# Patient Record
Sex: MALE | Race: WHITE | NOT HISPANIC OR LATINO | URBAN - METROPOLITAN AREA
[De-identification: names, ages, dates, MRNs, and addresses within clinical notes are randomized per-mention and may not be internally consistent; named-entity substitution may affect disease eponyms.]

---

## 2020-10-15 ENCOUNTER — OFFICE VISIT (OUTPATIENT)
Dept: URBAN - METROPOLITAN AREA CLINIC 121 | Facility: CLINIC | Age: 45
End: 2020-10-15

## 2021-09-27 ENCOUNTER — OFFICE VISIT (OUTPATIENT)
Dept: URBAN - METROPOLITAN AREA CLINIC 63 | Facility: CLINIC | Age: 46
End: 2021-09-27

## 2021-10-01 ENCOUNTER — OFFICE VISIT (OUTPATIENT)
Dept: URBAN - METROPOLITAN AREA CLINIC 121 | Facility: CLINIC | Age: 46
End: 2021-10-01

## 2021-11-15 ENCOUNTER — OFFICE VISIT (OUTPATIENT)
Dept: URBAN - METROPOLITAN AREA MEDICAL CENTER 14 | Facility: MEDICAL CENTER | Age: 46
End: 2021-11-15

## 2021-11-29 ENCOUNTER — OFFICE VISIT (OUTPATIENT)
Dept: URBAN - METROPOLITAN AREA CLINIC 63 | Facility: CLINIC | Age: 46
End: 2021-11-29

## 2022-02-28 ENCOUNTER — OFFICE VISIT (OUTPATIENT)
Dept: URBAN - METROPOLITAN AREA CLINIC 63 | Facility: CLINIC | Age: 47
End: 2022-02-28

## 2022-06-22 ENCOUNTER — OFFICE VISIT (OUTPATIENT)
Dept: URBAN - METROPOLITAN AREA CLINIC 63 | Facility: CLINIC | Age: 47
End: 2022-06-22

## 2022-07-09 ENCOUNTER — TELEPHONE ENCOUNTER (OUTPATIENT)
Dept: URBAN - METROPOLITAN AREA CLINIC 121 | Facility: CLINIC | Age: 47
End: 2022-07-09

## 2022-07-09 RX ORDER — WARFARIN 10 MG/1
TABLET ORAL ONCE A DAY
Refills: 0 | OUTPATIENT
Start: 2021-09-27 | End: 2021-11-29

## 2022-07-09 RX ORDER — METOPROLOL SUCCINATE 50 MG/1
TABLET, EXTENDED RELEASE ORAL ONCE A DAY
Refills: 0 | OUTPATIENT
Start: 2021-09-27 | End: 2021-11-29

## 2022-07-09 RX ORDER — WARFARIN 10 MG/1
TABLET ORAL ONCE A DAY
Refills: 0 | OUTPATIENT
Start: 2021-11-29 | End: 2021-11-29

## 2022-07-10 ENCOUNTER — TELEPHONE ENCOUNTER (OUTPATIENT)
Dept: URBAN - METROPOLITAN AREA CLINIC 121 | Facility: CLINIC | Age: 47
End: 2022-07-10

## 2022-07-10 RX ORDER — AZATHIOPRINE 50 MG/1
4 TABLETS DAILY TABLET ORAL
Refills: 3 | Status: ACTIVE | COMMUNITY
Start: 2021-12-15

## 2022-07-10 RX ORDER — METOPROLOL SUCCINATE 25 MG/1
0.5 TABS PO ONE TIME DAILY TABLET, EXTENDED RELEASE ORAL TAKE AS DIRECTED
Refills: 0 | Status: ACTIVE | COMMUNITY
Start: 2021-11-29

## 2022-07-10 RX ORDER — ADALIMUMAB 40MG/0.4ML
EVERY OTHER WEEK KIT SUBCUTANEOUS
Refills: 0 | Status: ACTIVE | COMMUNITY
Start: 2022-06-22

## 2022-07-10 RX ORDER — VIT A/VIT C/VIT E/ZINC/COPPER 4296-226
CAPSULE ORAL ONCE A DAY
Refills: 0 | Status: ACTIVE | COMMUNITY
Start: 2021-11-29

## 2022-07-10 RX ORDER — ASPIRIN 81 MG/1
TABLET, FILM COATED ORAL ONCE A DAY
Refills: 0 | Status: ACTIVE | COMMUNITY
Start: 2021-11-29

## 2022-07-10 RX ORDER — WARFARIN SODIUM 5 MG/1
TABLET ORAL
Refills: 0 | Status: ACTIVE | COMMUNITY
Start: 2021-11-29

## 2022-07-18 ENCOUNTER — TELEPHONE ENCOUNTER (OUTPATIENT)
Dept: URBAN - METROPOLITAN AREA CLINIC 63 | Facility: CLINIC | Age: 47
End: 2022-07-18

## 2022-07-19 ENCOUNTER — TELEPHONE ENCOUNTER (OUTPATIENT)
Dept: URBAN - METROPOLITAN AREA CLINIC 63 | Facility: CLINIC | Age: 47
End: 2022-07-19

## 2022-07-22 ENCOUNTER — TELEPHONE ENCOUNTER (OUTPATIENT)
Dept: URBAN - METROPOLITAN AREA CLINIC 63 | Facility: CLINIC | Age: 47
End: 2022-07-22

## 2022-08-03 ENCOUNTER — TELEPHONE ENCOUNTER (OUTPATIENT)
Dept: URBAN - METROPOLITAN AREA CLINIC 63 | Facility: CLINIC | Age: 47
End: 2022-08-03

## 2022-08-13 ENCOUNTER — WEB ENCOUNTER (OUTPATIENT)
Dept: URBAN - METROPOLITAN AREA CLINIC 63 | Facility: CLINIC | Age: 47
End: 2022-08-13

## 2022-09-12 ENCOUNTER — OFFICE VISIT (OUTPATIENT)
Dept: URBAN - METROPOLITAN AREA CLINIC 63 | Facility: CLINIC | Age: 47
End: 2022-09-12
Payer: SELF-PAY

## 2022-09-12 ENCOUNTER — WEB ENCOUNTER (OUTPATIENT)
Dept: URBAN - METROPOLITAN AREA CLINIC 63 | Facility: CLINIC | Age: 47
End: 2022-09-12

## 2022-09-12 ENCOUNTER — LAB OUTSIDE AN ENCOUNTER (OUTPATIENT)
Dept: URBAN - METROPOLITAN AREA CLINIC 63 | Facility: CLINIC | Age: 47
End: 2022-09-12

## 2022-09-12 VITALS
TEMPERATURE: 97.3 F | WEIGHT: 186.2 LBS | OXYGEN SATURATION: 99 % | HEIGHT: 75 IN | HEART RATE: 77 BPM | BODY MASS INDEX: 23.15 KG/M2 | SYSTOLIC BLOOD PRESSURE: 120 MMHG | DIASTOLIC BLOOD PRESSURE: 77 MMHG

## 2022-09-12 DIAGNOSIS — K50.80 CROHN'S DISEASE OF BOTH SMALL AND LARGE INTESTINE WITHOUT COMPLICATION: ICD-10-CM

## 2022-09-12 PROCEDURE — 99213 OFFICE O/P EST LOW 20 MIN: CPT | Performed by: INTERNAL MEDICINE

## 2022-09-12 RX ORDER — VIT A/VIT C/VIT E/ZINC/COPPER 4296-226
CAPSULE ORAL ONCE A DAY
Refills: 0 | Status: ACTIVE | COMMUNITY
Start: 2021-11-29

## 2022-09-12 RX ORDER — METOPROLOL SUCCINATE 50 MG/1
1 TABLET TABLET, FILM COATED, EXTENDED RELEASE ORAL ONCE A DAY
Refills: 0 | Status: ACTIVE | COMMUNITY
Start: 2021-11-29

## 2022-09-12 RX ORDER — ASPIRIN 81 MG/1
TABLET, FILM COATED ORAL ONCE A DAY
Refills: 0 | Status: ACTIVE | COMMUNITY
Start: 2021-11-29

## 2022-09-12 RX ORDER — ADALIMUMAB 40MG/0.4ML
EVERY OTHER WEEK KIT SUBCUTANEOUS
Refills: 0 | Status: ACTIVE | COMMUNITY
Start: 2022-06-22

## 2022-09-12 RX ORDER — WARFARIN SODIUM 5 MG/1
TABLET ORAL
Refills: 0 | Status: ACTIVE | COMMUNITY
Start: 2021-11-29

## 2022-09-12 NOTE — HPI-TODAY'S VISIT:
This is a 46-year-old male patient coming in for follow-up of Crohn's disease.  Patient has had some loose stools with bright red bleeding per rectum which prompted colonoscopy.  Colonoscopy showed mild inflammation in the TI characterized by aphthous ulcers and IC valve stenosis.  Chronic inflammatory changes were seen in the colon as noted.  Active and chronic inflammation was seen in the left colon.  Biopsies came back positive for hyperemia in the terminal ileum and right colon.  Transverse colon and left colon showed mild active and chronic inflammation.  Crypt abscess was seen in the left colon concerning for Crohn's disease as well.  CT enterography showed mild degree of distal ileal stricturing with active phase of moderate severity.  Patient has been on Humira 40 every other week since then.  He no longer has any blood in the stools.  Had some elevated stool calprotectin at 170 in the past.  Recent blood work shows normal ESR, CRP, stool calprotectin at 89.  Completely asymptomatic at this time.  Humira levels were seen at 11.5 with no antibody levels.  Last colonoscopy was in November 2021.

## 2022-09-17 ENCOUNTER — WEB ENCOUNTER (OUTPATIENT)
Dept: URBAN - METROPOLITAN AREA CLINIC 63 | Facility: CLINIC | Age: 47
End: 2022-09-17

## 2022-10-18 ENCOUNTER — TELEPHONE ENCOUNTER (OUTPATIENT)
Dept: URBAN - METROPOLITAN AREA CLINIC 63 | Facility: CLINIC | Age: 47
End: 2022-10-18

## 2022-10-25 ENCOUNTER — OFFICE VISIT (OUTPATIENT)
Dept: URBAN - METROPOLITAN AREA SURGERY CENTER 4 | Facility: SURGERY CENTER | Age: 47
End: 2022-10-25
Payer: SELF-PAY

## 2022-10-25 ENCOUNTER — CLAIMS CREATED FROM THE CLAIM WINDOW (OUTPATIENT)
Dept: URBAN - METROPOLITAN AREA CLINIC 4 | Facility: CLINIC | Age: 47
End: 2022-10-25
Payer: SELF-PAY

## 2022-10-25 DIAGNOSIS — K52.9 NONINFECTIOUS GASTROENTERITIS AND COLITIS, UNSPECIFIED: ICD-10-CM

## 2022-10-25 DIAGNOSIS — K63.89 OTHER SPECIFIED DISEASES OF INTESTINE: ICD-10-CM

## 2022-10-25 DIAGNOSIS — K52.3 INDETERMINATE COLITIS: ICD-10-CM

## 2022-10-25 DIAGNOSIS — K50.812 CROHN'S DISEASE OF BOTH SMALL AND LARGE INTESTINE WITH INTESTINAL OBSTRUCTION: ICD-10-CM

## 2022-10-25 DIAGNOSIS — K64.1 GRADE II INTERNAL HEMORRHOIDS: ICD-10-CM

## 2022-10-25 PROCEDURE — 45380 COLONOSCOPY AND BIOPSY: CPT | Performed by: INTERNAL MEDICINE

## 2022-10-25 PROCEDURE — 88305 TISSUE EXAM BY PATHOLOGIST: CPT | Performed by: PATHOLOGY

## 2022-10-25 RX ORDER — ADALIMUMAB 40MG/0.4ML
EVERY OTHER WEEK KIT SUBCUTANEOUS
Refills: 0 | Status: ACTIVE | COMMUNITY
Start: 2022-06-22

## 2022-10-25 RX ORDER — VIT A/VIT C/VIT E/ZINC/COPPER 4296-226
CAPSULE ORAL ONCE A DAY
Refills: 0 | Status: ACTIVE | COMMUNITY
Start: 2021-11-29

## 2022-10-25 RX ORDER — ASPIRIN 81 MG/1
TABLET, FILM COATED ORAL ONCE A DAY
Refills: 0 | Status: ACTIVE | COMMUNITY
Start: 2021-11-29

## 2022-10-25 RX ORDER — METOPROLOL SUCCINATE 50 MG/1
1 TABLET TABLET, FILM COATED, EXTENDED RELEASE ORAL ONCE A DAY
Refills: 0 | Status: ACTIVE | COMMUNITY
Start: 2021-11-29

## 2022-10-25 RX ORDER — WARFARIN SODIUM 5 MG/1
TABLET ORAL
Refills: 0 | Status: ACTIVE | COMMUNITY
Start: 2021-11-29

## 2022-10-31 PROBLEM — 1085831000119104 INTESTINAL OBSTRUCTION DUE TO CROHN'S DISEASE OF SMALL AND LARGE INTESTINE: Status: ACTIVE | Noted: 2022-10-31

## 2022-11-14 ENCOUNTER — OFFICE VISIT (OUTPATIENT)
Dept: URBAN - METROPOLITAN AREA CLINIC 63 | Facility: CLINIC | Age: 47
End: 2022-11-14

## 2022-11-18 ENCOUNTER — OFFICE VISIT (OUTPATIENT)
Dept: URBAN - METROPOLITAN AREA CLINIC 63 | Facility: CLINIC | Age: 47
End: 2022-11-18
Payer: SELF-PAY

## 2022-11-18 VITALS
TEMPERATURE: 97.7 F | SYSTOLIC BLOOD PRESSURE: 120 MMHG | DIASTOLIC BLOOD PRESSURE: 71 MMHG | WEIGHT: 184 LBS | HEIGHT: 75 IN | OXYGEN SATURATION: 100 % | HEART RATE: 71 BPM | BODY MASS INDEX: 22.88 KG/M2

## 2022-11-18 DIAGNOSIS — K50.80 CROHN'S DISEASE OF BOTH SMALL AND LARGE INTESTINE WITHOUT COMPLICATION: ICD-10-CM

## 2022-11-18 PROBLEM — 71833008: Status: ACTIVE | Noted: 2022-09-12

## 2022-11-18 PROCEDURE — 99214 OFFICE O/P EST MOD 30 MIN: CPT | Performed by: INTERNAL MEDICINE

## 2022-11-18 RX ORDER — ADALIMUMAB 40MG/0.4ML
EVERY OTHER WEEK KIT SUBCUTANEOUS
Refills: 0 | Status: ACTIVE | COMMUNITY
Start: 2022-06-22

## 2022-11-18 RX ORDER — ASPIRIN 81 MG/1
TABLET, FILM COATED ORAL ONCE A DAY
Refills: 0 | Status: ACTIVE | COMMUNITY
Start: 2021-11-29

## 2022-11-18 RX ORDER — VIT A/VIT C/VIT E/ZINC/COPPER 4296-226
CAPSULE ORAL ONCE A DAY
Refills: 0 | Status: ACTIVE | COMMUNITY
Start: 2021-11-29

## 2022-11-18 RX ORDER — METOPROLOL SUCCINATE 50 MG/1
1 TABLET TABLET, FILM COATED, EXTENDED RELEASE ORAL ONCE A DAY
Refills: 0 | Status: ACTIVE | COMMUNITY
Start: 2021-11-29

## 2022-11-18 RX ORDER — WARFARIN SODIUM 5 MG/1
TABLET ORAL
Refills: 0 | Status: ACTIVE | COMMUNITY
Start: 2021-11-29

## 2022-11-18 NOTE — HPI-TODAY'S VISIT:
This is a 46-year-old male patient coming in for follow-up of Crohn's disease.  Patient has had some loose stools with bright red bleeding per rectum which prompted colonoscopy.  Colonoscopy in 2021 showed mild inflammation in the TI characterized by aphthous ulcers and IC valve stenosis.  Chronic inflammatory changes were seen in the colon as noted.  Active and chronic inflammation was seen in the left colon.  Biopsies came back positive for hyperemia in the terminal ileum and right colon.  Transverse colon and left colon showed mild active and chronic inflammation.  Crypt abscess was seen in the left colon concerning for Crohn's disease as well.  CT enterography showed mild degree of distal ileal stricturing with active phase of moderate severity.  Patient has been on Humira 40 every other week since then.  He no longer has any blood in the stools.  Had some elevated stool calprotectin at 170 in the past.  Recent blood work shows normal ESR, CRP, stool calprotectin at 89.  Completely asymptomatic at this time.  Humira levels were seen at 11.5 with no antibody levels.  Recent colonoscopy with mid active inflammtion in the TI, with stricture, colitis in the entire colon.

## 2022-12-09 ENCOUNTER — TELEPHONE ENCOUNTER (OUTPATIENT)
Dept: URBAN - METROPOLITAN AREA CLINIC 63 | Facility: CLINIC | Age: 47
End: 2022-12-09

## 2022-12-14 PROBLEM — 34000006: Status: ACTIVE | Noted: 2022-12-14

## 2023-01-08 ENCOUNTER — WEB ENCOUNTER (OUTPATIENT)
Dept: URBAN - METROPOLITAN AREA CLINIC 63 | Facility: CLINIC | Age: 48
End: 2023-01-08

## 2023-02-10 ENCOUNTER — TELEPHONE ENCOUNTER (OUTPATIENT)
Dept: URBAN - METROPOLITAN AREA CLINIC 63 | Facility: CLINIC | Age: 48
End: 2023-02-10

## 2023-04-10 ENCOUNTER — OFFICE VISIT (OUTPATIENT)
Dept: URBAN - METROPOLITAN AREA CLINIC 63 | Facility: CLINIC | Age: 48
End: 2023-04-10
Payer: SELF-PAY

## 2023-04-10 VITALS
DIASTOLIC BLOOD PRESSURE: 82 MMHG | WEIGHT: 178.8 LBS | BODY MASS INDEX: 22.23 KG/M2 | OXYGEN SATURATION: 98 % | HEIGHT: 75 IN | TEMPERATURE: 98 F | SYSTOLIC BLOOD PRESSURE: 132 MMHG | HEART RATE: 71 BPM

## 2023-04-10 DIAGNOSIS — K50.90 CROHN'S DISEASE WITHOUT COMPLICATION, UNSPECIFIED GASTROINTESTINAL TRACT LOCATION: ICD-10-CM

## 2023-04-10 PROCEDURE — 99214 OFFICE O/P EST MOD 30 MIN: CPT | Performed by: INTERNAL MEDICINE

## 2023-04-10 RX ORDER — ASPIRIN 81 MG/1
TABLET, FILM COATED ORAL ONCE A DAY
Refills: 0 | Status: ACTIVE | COMMUNITY
Start: 2021-11-29

## 2023-04-10 RX ORDER — METOPROLOL SUCCINATE 50 MG/1
1 TABLET TABLET, FILM COATED, EXTENDED RELEASE ORAL ONCE A DAY
Refills: 0 | Status: ACTIVE | COMMUNITY
Start: 2021-11-29

## 2023-04-10 RX ORDER — VIT A/VIT C/VIT E/ZINC/COPPER 4296-226
CAPSULE ORAL ONCE A DAY
Refills: 0 | Status: ACTIVE | COMMUNITY
Start: 2021-11-29

## 2023-04-10 RX ORDER — WARFARIN SODIUM 5 MG/1
TABLET ORAL
Refills: 0 | Status: ACTIVE | COMMUNITY
Start: 2021-11-29

## 2023-04-10 NOTE — HPI-TODAY'S VISIT:
This is a 46-year-old male patient coming in for follow-up of Crohn's disease.  Patient has had some loose stools with bright red bleeding per rectum which prompted colonoscopy.  Colonoscopy in 2021 showed mild inflammation in the TI characterized by aphthous ulcers and IC valve stenosis.  Chronic inflammatory changes were seen in the colon as noted.  Active and chronic inflammation was seen in the left colon.  Biopsies came back positive for hyperemia in the terminal ileum and right colon.  Transverse colon and left colon showed mild active and chronic inflammation.  Crypt abscess was seen in the left colon concerning for Crohn's disease as well.  CT enterography showed mild degree of distal ileal stricturing with active phase of moderate severity.  Patient has been on Humira 40 every other week since then.  He no longer has any blood in the stools.  Had some elevated stool calprotectin at 170 in the past.  Recent blood work shows normal ESR, CRP, stool calprotectin at 89.  Completely asymptomatic at this time.  Humira levels were seen at 11.5 with no antibody levels.  Recent colonoscopy with mid active inflammtion in the TI, with stricture, colitis in the entire colon. Switched to skyrizi. Patient feels worse symptomatically. 6-8 bowel movements/day. occ blood i the stool. ABd pain. s/p loading infusions. will start maintenance next week.

## 2023-04-15 LAB
A/G RATIO: 1.7
ALBUMIN: 4.1
ALKALINE PHOSPHATASE: 93
ALT (SGPT): 13
AMBIG ABBREV CMP14 DEFAULT: (no result)
AST (SGOT): 20
BASO (ABSOLUTE): 0
BASOS: 1
BILIRUBIN, TOTAL: 0.8
BUN/CREATININE RATIO: 9
BUN: 9
CALCIUM: 9.3
CALPROTECTIN, FECAL: 434
CARBON DIOXIDE, TOTAL: 28
CHLORIDE: 103
CREATININE: 0.98
EGFR: 96
EOS (ABSOLUTE): 0.2
EOS: 4
GLOBULIN, TOTAL: 2.4
GLUCOSE: 88
HEMATOCRIT: 42.2
HEMATOLOGY COMMENTS:: (no result)
HEMOGLOBIN: 14.2
IMMATURE CELLS: (no result)
IMMATURE GRANS (ABS): 0
IMMATURE GRANULOCYTES: 0
LYMPHS (ABSOLUTE): 1.2
LYMPHS: 26
MCH: 29.2
MCHC: 33.6
MCV: 87
MONOCYTES(ABSOLUTE): 0.5
MONOCYTES: 12
NEUTROPHILS (ABSOLUTE): 2.5
NEUTROPHILS: 57
NRBC: (no result)
PLATELETS: 261
POTASSIUM: 5.3
PROTEIN, TOTAL: 6.5
RBC: 4.86
RDW: 12.9
SODIUM: 140
WBC: 4.5

## 2023-04-18 ENCOUNTER — WEB ENCOUNTER (OUTPATIENT)
Dept: URBAN - METROPOLITAN AREA CLINIC 63 | Facility: CLINIC | Age: 48
End: 2023-04-18

## 2023-04-24 ENCOUNTER — OFFICE VISIT (OUTPATIENT)
Dept: URBAN - METROPOLITAN AREA TELEHEALTH 1 | Facility: TELEHEALTH | Age: 48
End: 2023-04-24

## 2023-04-24 ENCOUNTER — TELEPHONE ENCOUNTER (OUTPATIENT)
Dept: URBAN - METROPOLITAN AREA CLINIC 64 | Facility: CLINIC | Age: 48
End: 2023-04-24

## 2023-04-24 RX ORDER — WARFARIN SODIUM 5 MG/1
TABLET ORAL
Refills: 0 | COMMUNITY
Start: 2021-11-29

## 2023-04-24 RX ORDER — VIT A/VIT C/VIT E/ZINC/COPPER 4296-226
CAPSULE ORAL ONCE A DAY
Refills: 0 | COMMUNITY
Start: 2021-11-29

## 2023-04-24 RX ORDER — METOPROLOL SUCCINATE 50 MG/1
1 TABLET TABLET, FILM COATED, EXTENDED RELEASE ORAL ONCE A DAY
Refills: 0 | COMMUNITY
Start: 2021-11-29

## 2023-04-24 RX ORDER — ASPIRIN 81 MG/1
TABLET, FILM COATED ORAL ONCE A DAY
Refills: 0 | COMMUNITY
Start: 2021-11-29

## 2023-04-25 ENCOUNTER — OFFICE VISIT (OUTPATIENT)
Dept: URBAN - METROPOLITAN AREA CLINIC 63 | Facility: CLINIC | Age: 48
End: 2023-04-25
Payer: SELF-PAY

## 2023-04-25 VITALS
TEMPERATURE: 97.8 F | WEIGHT: 182.8 LBS | HEART RATE: 75 BPM | SYSTOLIC BLOOD PRESSURE: 128 MMHG | HEIGHT: 75 IN | OXYGEN SATURATION: 98 % | DIASTOLIC BLOOD PRESSURE: 84 MMHG | BODY MASS INDEX: 22.73 KG/M2

## 2023-04-25 DIAGNOSIS — K50.80 CROHN'S DISEASE OF BOTH SMALL AND LARGE INTESTINE WITHOUT COMPLICATION: ICD-10-CM

## 2023-04-25 DIAGNOSIS — Z95.2 HISTORY OF MECHANICAL AORTIC VALVE REPLACEMENT: ICD-10-CM

## 2023-04-25 DIAGNOSIS — Z79.01 LONG TERM CURRENT USE OF ANTICOAGULANT: ICD-10-CM

## 2023-04-25 PROBLEM — 711150003: Status: ACTIVE | Noted: 2023-04-25

## 2023-04-25 PROBLEM — 125411000119107: Status: ACTIVE | Noted: 2023-04-25

## 2023-04-25 PROCEDURE — 99214 OFFICE O/P EST MOD 30 MIN: CPT | Performed by: NURSE PRACTITIONER

## 2023-04-25 RX ORDER — WARFARIN SODIUM 5 MG/1
TABLET ORAL
Refills: 0 | Status: ACTIVE | COMMUNITY
Start: 2021-11-29

## 2023-04-25 RX ORDER — VIT A/VIT C/VIT E/ZINC/COPPER 4296-226
CAPSULE ORAL ONCE A DAY
Refills: 0 | Status: ACTIVE | COMMUNITY
Start: 2021-11-29

## 2023-04-25 RX ORDER — ASPIRIN 81 MG/1
TABLET, FILM COATED ORAL ONCE A DAY
Refills: 0 | Status: ACTIVE | COMMUNITY
Start: 2021-11-29

## 2023-04-25 RX ORDER — METOPROLOL SUCCINATE 50 MG/1
1 TABLET TABLET, FILM COATED, EXTENDED RELEASE ORAL ONCE A DAY
Refills: 0 | Status: ACTIVE | COMMUNITY
Start: 2021-11-29

## 2023-04-25 NOTE — PHYSICAL EXAM CARDIOVASCULAR:
no edema, grade 5/6 systolic murmur at left sternal border with thrill, ,  regular rate and rhythm , no edema.

## 2023-04-25 NOTE — PHYSICAL EXAM GASTROINTESTINAL
Abdomen , soft, nontender, nondistended , no guarding or rigidity , no masses palpable , normal bowel sounds , Liver and Spleen , no hepatomegaly present , liver nontender , spleen not palpable, No Ascites, No hernia

## 2023-04-25 NOTE — HPI-CROHN'S DISEASE
Diagnosed 2021  Last Colonoscopy: 10/2022 colonoscopy appears improved compared to 2021 exam but there is still mild ileitis and narrowing present  Current IBD Meds: skyrizi, was due for first OBI last week  Prior IBD Meds: Humira 40mg Biweekly  Steroid use/response: none  Most recent imagin2021 distal ileal stricturing  Extraintestinal manifestations: none  Inflammatory Markers: 2023 calprotectin 453 up from 89 on humira  Required  biologic testin22 quant gold and hep b negative  Risk factors for severe disease: male sex, small bowel disease, stricturing phenotype  IBD surgical history: none  Personal history of cancer: none  Cardiac history: mechanical aortic valve replacement in , post operative afib. Valve replaced due to bicuspid aortic valve

## 2023-04-26 ENCOUNTER — WEB ENCOUNTER (OUTPATIENT)
Dept: URBAN - METROPOLITAN AREA CLINIC 63 | Facility: CLINIC | Age: 48
End: 2023-04-26

## 2023-04-26 ENCOUNTER — TELEPHONE ENCOUNTER (OUTPATIENT)
Dept: URBAN - METROPOLITAN AREA CLINIC 63 | Facility: CLINIC | Age: 48
End: 2023-04-26

## 2023-04-28 ENCOUNTER — WEB ENCOUNTER (OUTPATIENT)
Dept: URBAN - METROPOLITAN AREA CLINIC 63 | Facility: CLINIC | Age: 48
End: 2023-04-28

## 2023-05-16 ENCOUNTER — TELEPHONE ENCOUNTER (OUTPATIENT)
Dept: URBAN - METROPOLITAN AREA CLINIC 63 | Facility: CLINIC | Age: 48
End: 2023-05-16

## 2023-06-06 ENCOUNTER — OFFICE VISIT (OUTPATIENT)
Dept: URBAN - METROPOLITAN AREA CLINIC 63 | Facility: CLINIC | Age: 48
End: 2023-06-06
Payer: SELF-PAY

## 2023-06-06 VITALS
OXYGEN SATURATION: 99 % | HEART RATE: 55 BPM | HEIGHT: 75 IN | WEIGHT: 183.8 LBS | TEMPERATURE: 97.4 F | BODY MASS INDEX: 22.85 KG/M2 | DIASTOLIC BLOOD PRESSURE: 70 MMHG | SYSTOLIC BLOOD PRESSURE: 120 MMHG

## 2023-06-06 DIAGNOSIS — K50.80 CROHN'S DISEASE OF BOTH SMALL AND LARGE INTESTINE WITHOUT COMPLICATION: ICD-10-CM

## 2023-06-06 DIAGNOSIS — Z79.01 LONG TERM CURRENT USE OF ANTICOAGULANT: ICD-10-CM

## 2023-06-06 DIAGNOSIS — H04.123 DRY EYES: ICD-10-CM

## 2023-06-06 DIAGNOSIS — G93.32 CHRONIC FATIGUE SYNDROME: ICD-10-CM

## 2023-06-06 PROBLEM — 162290004: Status: ACTIVE | Noted: 2023-06-06

## 2023-06-06 PROCEDURE — 99214 OFFICE O/P EST MOD 30 MIN: CPT | Performed by: NURSE PRACTITIONER

## 2023-06-06 RX ORDER — VIT A/VIT C/VIT E/ZINC/COPPER 4296-226
CAPSULE ORAL ONCE A DAY
Refills: 0 | Status: ACTIVE | COMMUNITY
Start: 2021-11-29

## 2023-06-06 RX ORDER — WARFARIN SODIUM 5 MG/1
TABLET ORAL
Refills: 0 | Status: ACTIVE | COMMUNITY
Start: 2021-11-29

## 2023-06-06 RX ORDER — ASPIRIN 81 MG/1
TABLET, FILM COATED ORAL ONCE A DAY
Refills: 0 | Status: ACTIVE | COMMUNITY
Start: 2021-11-29

## 2023-06-06 RX ORDER — METOPROLOL SUCCINATE 50 MG/1
1 TABLET TABLET, FILM COATED, EXTENDED RELEASE ORAL ONCE A DAY
Refills: 0 | Status: ACTIVE | COMMUNITY
Start: 2021-11-29

## 2023-06-06 NOTE — HPI-TODAY'S VISIT:
Here for follow up of his crohn's disease. Stopped skyrizi and restarted humira with induction roughly 5 weeks ago. Moved immediately to weekly dosing Abdominal pain has resolved, no bleeding in a month and now having 2-3 bm per day rather than 5-10. Fatigue has improved but not resolved.  When on Humira biweekly last year he was asymptomatic from a gi standpoint with a drug level of 11.5 but had mild inflammation at his TI on follow up colonoscopy.  Does not have health insurance currently.

## 2023-06-06 NOTE — HPI-CROHN'S DISEASE
Diagnosed 2021  Last Colonoscopy: 10/2022 colonoscopy appears improved compared to 2021 exam but there is still mild ileitis and narrowing present  Current IBD Meds: Humira 40mg weekly  Prior IBD Meds: Humira 40mg Biweekly, skyrizi 3 inductions doses  Steroid use/response: none  Most recent imagin2021 distal ileal stricturing  Extraintestinal manifestations: none  Inflammatory Markers: 2023 calprotectin 453 up from 89 on humira, CRP and sed rate always normal  Required  biologic testin22 quant gold and hep b negative  Risk factors for severe disease: male sex, small bowel disease, stricturing phenotype  IBD surgical history: none  Personal history of cancer: none   Cardiac history: mechanical aortic valve replacement in , post operative afib. Valve replaced due to bicuspid aortic valve

## 2023-06-21 ENCOUNTER — TELEPHONE ENCOUNTER (OUTPATIENT)
Dept: URBAN - METROPOLITAN AREA CLINIC 63 | Facility: CLINIC | Age: 48
End: 2023-06-21

## 2023-09-12 ENCOUNTER — WEB ENCOUNTER (OUTPATIENT)
Dept: URBAN - METROPOLITAN AREA CLINIC 63 | Facility: CLINIC | Age: 48
End: 2023-09-12

## 2023-09-12 ENCOUNTER — OFFICE VISIT (OUTPATIENT)
Dept: URBAN - METROPOLITAN AREA CLINIC 63 | Facility: CLINIC | Age: 48
End: 2023-09-12
Payer: SELF-PAY

## 2023-09-12 VITALS
WEIGHT: 189.2 LBS | SYSTOLIC BLOOD PRESSURE: 130 MMHG | DIASTOLIC BLOOD PRESSURE: 80 MMHG | TEMPERATURE: 97.7 F | BODY MASS INDEX: 23.52 KG/M2 | HEART RATE: 61 BPM | OXYGEN SATURATION: 96 % | HEIGHT: 75 IN

## 2023-09-12 DIAGNOSIS — Z79.01 LONG TERM CURRENT USE OF ANTICOAGULANT: ICD-10-CM

## 2023-09-12 DIAGNOSIS — K50.80 CROHN'S DISEASE OF BOTH SMALL AND LARGE INTESTINE WITHOUT COMPLICATION: ICD-10-CM

## 2023-09-12 DIAGNOSIS — G93.32 CHRONIC FATIGUE SYNDROME: ICD-10-CM

## 2023-09-12 DIAGNOSIS — Z95.2 HISTORY OF MECHANICAL AORTIC VALVE REPLACEMENT: ICD-10-CM

## 2023-09-12 PROCEDURE — 99214 OFFICE O/P EST MOD 30 MIN: CPT | Performed by: NURSE PRACTITIONER

## 2023-09-12 RX ORDER — ADALIMUMAB 40MG/0.8ML
0.8 ML KIT SUBCUTANEOUS
Status: ACTIVE | COMMUNITY

## 2023-09-12 RX ORDER — VIT A/VIT C/VIT E/ZINC/COPPER 4296-226
CAPSULE ORAL ONCE A DAY
Refills: 0 | Status: ACTIVE | COMMUNITY
Start: 2021-11-29

## 2023-09-12 RX ORDER — WARFARIN SODIUM 5 MG/1
TABLET ORAL
Refills: 0 | Status: ACTIVE | COMMUNITY
Start: 2021-11-29

## 2023-09-12 RX ORDER — ASPIRIN 81 MG/1
TABLET, FILM COATED ORAL ONCE A DAY
Refills: 0 | Status: ACTIVE | COMMUNITY
Start: 2021-11-29

## 2023-09-12 RX ORDER — THIAMINE HCL 100 MG
1 TABLET TABLET ORAL ONCE A DAY
Qty: 90 TABLET | Refills: 3 | OUTPATIENT
Start: 2023-09-12 | End: 2024-09-06

## 2023-09-12 RX ORDER — METOPROLOL SUCCINATE 50 MG/1
1 TABLET TABLET, FILM COATED, EXTENDED RELEASE ORAL ONCE A DAY
Refills: 0 | Status: ACTIVE | COMMUNITY
Start: 2021-11-29

## 2023-09-12 NOTE — HPI-TODAY'S VISIT:
Here for follow up of his crohn's disease. On humira weekly after stopping skyrizi following no response. LAst calprotectin 205 2 weeks ago. CBC/CMP basically normal Abdominal pain has resolved, no bleeding in a month and now having 1-2 bm per day down from 5-10. Denies bleeding, mucous, abdominl pain or urgency. Has loose stool perhaps every other week. Fatigue has improved but not resolved. Has numbness/tingling of the extremities, ongoing for years and saw neurology with no answers.   When on Humira biweekly last year he was asymptomatic from a gi standpoint with a drug level of 11.5 but had mild inflammation at his TI on follow up colonoscopy.  Does not have health insurance currently.

## 2023-09-12 NOTE — HPI-CROHN'S DISEASE
Diagnosed 2021  Last Colonoscopy: 10/2022 colonoscopy appears improved compared to 2021 exam but there is still mild ileitis and narrowing present  Current IBD Meds: Humira 40mg weekly  Prior IBD Meds: Humira 40mg Biweekly, skyrizi 3 inductions doses  Steroid use/response: none  Most recent imagin2021 distal ileal stricturing  Extraintestinal manifestations: chronic fatigue, numbness  Inflammatory Markers: 23 calprotectin 205, 2023 calprotectin 453(end of skyrizi induction) up from 89 on humira, CRP and sed rate always normal  Required  biologic testin22 quant gold and hep b negative  Risk factors for severe disease: male sex, small bowel disease, stricturing phenotype  IBD surgical history: none  Personal history of cancer: none   Cardiac history: mechanical aortic valve replacement in , post operative afib. Valve replaced due to bicuspid aortic valve

## 2023-09-19 ENCOUNTER — WEB ENCOUNTER (OUTPATIENT)
Dept: URBAN - METROPOLITAN AREA CLINIC 63 | Facility: CLINIC | Age: 48
End: 2023-09-19

## 2023-09-19 RX ORDER — THIAMINE HCL 100 MG
1 TABLET TABLET ORAL ONCE A DAY
Qty: 90 TABLET | Refills: 3 | OUTPATIENT
Start: 2023-09-20 | End: 2024-09-14

## 2023-09-20 ENCOUNTER — WEB ENCOUNTER (OUTPATIENT)
Dept: URBAN - METROPOLITAN AREA CLINIC 63 | Facility: CLINIC | Age: 48
End: 2023-09-20

## 2023-10-17 ENCOUNTER — WEB ENCOUNTER (OUTPATIENT)
Dept: URBAN - METROPOLITAN AREA CLINIC 63 | Facility: CLINIC | Age: 48
End: 2023-10-17

## 2024-01-16 ENCOUNTER — OFFICE VISIT (OUTPATIENT)
Dept: URBAN - METROPOLITAN AREA CLINIC 63 | Facility: CLINIC | Age: 49
End: 2024-01-16

## 2024-01-29 ENCOUNTER — DASHBOARD ENCOUNTERS (OUTPATIENT)
Age: 49
End: 2024-01-29

## 2024-01-29 ENCOUNTER — OFFICE VISIT (OUTPATIENT)
Dept: URBAN - METROPOLITAN AREA CLINIC 63 | Facility: CLINIC | Age: 49
End: 2024-01-29
Payer: SELF-PAY

## 2024-01-29 VITALS
HEART RATE: 79 BPM | WEIGHT: 194 LBS | SYSTOLIC BLOOD PRESSURE: 122 MMHG | OXYGEN SATURATION: 97 % | BODY MASS INDEX: 24.12 KG/M2 | HEIGHT: 75 IN | TEMPERATURE: 97.4 F | DIASTOLIC BLOOD PRESSURE: 80 MMHG

## 2024-01-29 DIAGNOSIS — G93.32 CHRONIC FATIGUE SYNDROME: ICD-10-CM

## 2024-01-29 DIAGNOSIS — Z79.01 LONG TERM CURRENT USE OF ANTICOAGULANT: ICD-10-CM

## 2024-01-29 DIAGNOSIS — K50.80 CROHN'S DISEASE OF BOTH SMALL AND LARGE INTESTINE WITHOUT COMPLICATION: ICD-10-CM

## 2024-01-29 PROCEDURE — 99214 OFFICE O/P EST MOD 30 MIN: CPT | Performed by: NURSE PRACTITIONER

## 2024-01-29 RX ORDER — WARFARIN SODIUM 5 MG/1
TABLET ORAL
Refills: 0 | Status: ACTIVE | COMMUNITY
Start: 2021-11-29

## 2024-01-29 RX ORDER — METOPROLOL SUCCINATE 50 MG/1
1 TABLET TABLET, FILM COATED, EXTENDED RELEASE ORAL ONCE A DAY
Refills: 0 | Status: ACTIVE | COMMUNITY
Start: 2021-11-29

## 2024-01-29 RX ORDER — ASPIRIN 81 MG/1
TABLET, FILM COATED ORAL ONCE A DAY
Refills: 0 | Status: ACTIVE | COMMUNITY
Start: 2021-11-29

## 2024-01-29 RX ORDER — ADALIMUMAB 40MG/0.8ML
0.8 ML KIT SUBCUTANEOUS
Status: ACTIVE | COMMUNITY

## 2024-01-29 RX ORDER — THIAMINE HCL 100 MG
1 TABLET TABLET ORAL ONCE A DAY
Qty: 90 TABLET | Refills: 3 | Status: ACTIVE | COMMUNITY
Start: 2023-09-20 | End: 2024-09-14

## 2024-01-29 NOTE — HPI-TODAY'S VISIT:
Here for follow up of crohn's disease. On humira weekly after stopping skyrizi for no response. Having 1-2 normal bm per day. No nausea, vomiting, bleeding, mucous, abdominal pain or urgency. Only complaint is fatigue, hgb normal. Doesn't feel he gets restful sleep. Has tried thiamine and magnesium. Doing great from gi standpoint other than fatigue. Loose stools probably 2-3 times per month. Has numbness/tingling of the extremities, ongoing for years and saw neurology with no answers. When on Humira biweekly in 2022 he was asymptomatic from a gi standpoint with a drug level of 11.5 but had mild inflammation at his TI on follow up colonoscopy.  Does not have health insurance currently.

## 2024-01-29 NOTE — PHYSICAL EXAM NEUROLOGIC:
oriented to person, place and time , normal sensation , short and long term memory intact Ketoconazole Pregnancy And Lactation Text: This medication is Pregnancy Category C and it isn't know if it is safe during pregnancy. It is also excreted in breast milk and breast feeding isn't recommended.

## 2024-05-09 ENCOUNTER — OFFICE VISIT (OUTPATIENT)
Dept: URBAN - METROPOLITAN AREA CLINIC 63 | Facility: CLINIC | Age: 49
End: 2024-05-09

## 2024-06-27 ENCOUNTER — LAB OUTSIDE AN ENCOUNTER (OUTPATIENT)
Dept: URBAN - METROPOLITAN AREA CLINIC 63 | Facility: CLINIC | Age: 49
End: 2024-06-27

## 2024-06-27 ENCOUNTER — OFFICE VISIT (OUTPATIENT)
Dept: URBAN - METROPOLITAN AREA CLINIC 63 | Facility: CLINIC | Age: 49
End: 2024-06-27
Payer: SELF-PAY

## 2024-06-27 VITALS
BODY MASS INDEX: 24.12 KG/M2 | WEIGHT: 194 LBS | HEIGHT: 75 IN | HEART RATE: 82 BPM | SYSTOLIC BLOOD PRESSURE: 122 MMHG | TEMPERATURE: 98.1 F | DIASTOLIC BLOOD PRESSURE: 78 MMHG | OXYGEN SATURATION: 98 %

## 2024-06-27 DIAGNOSIS — Z79.01 LONG TERM CURRENT USE OF ANTICOAGULANT: ICD-10-CM

## 2024-06-27 DIAGNOSIS — K50.80 CROHN'S DISEASE OF BOTH SMALL AND LARGE INTESTINE WITHOUT COMPLICATION: ICD-10-CM

## 2024-06-27 DIAGNOSIS — G93.32 CHRONIC FATIGUE SYNDROME: ICD-10-CM

## 2024-06-27 DIAGNOSIS — E80.4 GILBERT SYNDROME: ICD-10-CM

## 2024-06-27 PROBLEM — 27503000: Status: ACTIVE | Noted: 2024-06-27

## 2024-06-27 PROCEDURE — 99214 OFFICE O/P EST MOD 30 MIN: CPT

## 2024-06-27 RX ORDER — ADALIMUMAB 40MG/0.8ML
0.8 ML KIT SUBCUTANEOUS
Status: ACTIVE | COMMUNITY

## 2024-06-27 RX ORDER — LATANOPROST 50 UG/ML
1 DROP INTO AFFECTED EYE IN THE EVENING SOLUTION/ DROPS OPHTHALMIC ONCE A DAY
Status: ACTIVE | COMMUNITY

## 2024-06-27 RX ORDER — ASPIRIN 81 MG/1
TABLET, FILM COATED ORAL ONCE A DAY
Refills: 0 | Status: ACTIVE | COMMUNITY
Start: 2021-11-29

## 2024-06-27 RX ORDER — WARFARIN SODIUM 5 MG/1
TABLET ORAL
Refills: 0 | Status: ACTIVE | COMMUNITY
Start: 2021-11-29

## 2024-06-27 NOTE — HPI-TODAY'S VISIT:
This is a very pleasant 49-year-old male who presents for a 3-month follow-up of his Crohn's disease.  He will be establishing care under Dr. Infante today.  Past medical history significant for hypertensions, palpitations, aortic valve insufficiency.  Past surgical history is significant for ablation in 2021. FH noncontributory. Last colonoscopy was 10/25/2022.  Patient presents for follow up. He relays about 1-2 bowel movements a day. He tries to eat a well balanced healthy diet and does well with this. He denies dysphagia, dyspepsia, pyrosis, abdominal pain, change in bowel habits, unintentional weight loss, melena, or hematochezia. He denies eye changes, skin changes, arthralgias or oral findings. Of note, patient is self pay and tries to keep testing to minimum as much as possible.

## 2024-06-27 NOTE — PHYSICAL EXAM HENT:
Head, normocephalic, atraumatic, Face, Face within normal limits, Ears, External ears within normal limits negative...

## 2024-06-27 NOTE — HPI-PREVIOUS PROCEDURES
Colonoscopy, 10- - Stricture in the terminal ileum.  Biopsied. - Congested mucosa in the transverse colon, in the ascending colon and in the cecum.  Biopsied. - Diffuse mild inflammation was found in the rectosigmoid colon and in the descending colon.  Biopsied. - External hemorrhoids. - The examination was otherwise normal on direct and retroflexion views. . Pathology report, colonoscopy, 10/25/2024 - Terminal ileum biopsy; patchy chronic active enterocolitis, indeterminate type.  Negative for infectious organisms, dysplasia or malignancy - Colon right, biopsy; patchy chronic active colitis, indeterminate type.  Negative for infectious organisms, dysplasia or malignancy. - Transverse colon biopsy; patchy chronic active colitis, indeterminate type.  Negative for infectious organisms, dysplasia or malignancy. - Left colon biopsy; patchy chronic active colitis, indeterminate type.  Negative for infectious organisms dysplasia or malignancy.

## 2024-06-27 NOTE — HPI-PREVIOUS LABS
CMP and CBC, 06/17/2024 CMP - Glucose 105 - Total bilirubin 1.4 (high) . CBC with differential - Within normal limits . Fecal calprotectin, 06/18/2024 -160 (high) . 12/30/2022, hepatitis B surface antigen with reflex - Nonreactive . 12/30/2022, QuantiFERON gold - Negative

## 2024-06-27 NOTE — HPI-PREVIOUS IMAGING
CT abdomen and pelvis with contrast, enterography study, 11/18/2021 - CT findings highly suggestive of Crohn's disease, active phase of moderate severity.  Mild degree of distal ileal stricturing.  No evidence for fistulas or other complications - Multiple hepatic hemangiomas.

## 2024-07-16 ENCOUNTER — WEB ENCOUNTER (OUTPATIENT)
Dept: URBAN - METROPOLITAN AREA CLINIC 63 | Facility: CLINIC | Age: 49
End: 2024-07-16

## 2024-09-24 ENCOUNTER — LAB OUTSIDE AN ENCOUNTER (OUTPATIENT)
Dept: URBAN - METROPOLITAN AREA CLINIC 63 | Facility: CLINIC | Age: 49
End: 2024-09-24

## 2024-09-25 ENCOUNTER — TELEPHONE ENCOUNTER (OUTPATIENT)
Dept: URBAN - METROPOLITAN AREA CLINIC 64 | Facility: CLINIC | Age: 49
End: 2024-09-25

## 2024-09-26 ENCOUNTER — OFFICE VISIT (OUTPATIENT)
Dept: URBAN - METROPOLITAN AREA CLINIC 63 | Facility: CLINIC | Age: 49
End: 2024-09-26
Payer: SELF-PAY

## 2024-09-26 ENCOUNTER — OFFICE VISIT (OUTPATIENT)
Dept: URBAN - METROPOLITAN AREA CLINIC 63 | Facility: CLINIC | Age: 49
End: 2024-09-26

## 2024-09-26 ENCOUNTER — TELEPHONE ENCOUNTER (OUTPATIENT)
Dept: URBAN - METROPOLITAN AREA CLINIC 63 | Facility: CLINIC | Age: 49
End: 2024-09-26

## 2024-09-26 VITALS
DIASTOLIC BLOOD PRESSURE: 80 MMHG | TEMPERATURE: 97.7 F | BODY MASS INDEX: 24.62 KG/M2 | HEIGHT: 75 IN | SYSTOLIC BLOOD PRESSURE: 120 MMHG | OXYGEN SATURATION: 99 % | HEART RATE: 67 BPM | WEIGHT: 198 LBS

## 2024-09-26 DIAGNOSIS — G93.32 CHRONIC FATIGUE SYNDROME: ICD-10-CM

## 2024-09-26 DIAGNOSIS — Z79.01 LONG TERM CURRENT USE OF ANTICOAGULANT: ICD-10-CM

## 2024-09-26 DIAGNOSIS — E80.4 GILBERT SYNDROME: ICD-10-CM

## 2024-09-26 DIAGNOSIS — K50.80 CROHN'S DISEASE OF BOTH SMALL AND LARGE INTESTINE WITHOUT COMPLICATION: ICD-10-CM

## 2024-09-26 PROCEDURE — 99214 OFFICE O/P EST MOD 30 MIN: CPT

## 2024-09-26 RX ORDER — ADALIMUMAB 40MG/0.8ML
0.8 ML KIT SUBCUTANEOUS
Status: ACTIVE | COMMUNITY

## 2024-09-26 RX ORDER — ASPIRIN 81 MG/1
TABLET, FILM COATED ORAL ONCE A DAY
Refills: 0 | Status: ACTIVE | COMMUNITY
Start: 2021-11-29

## 2024-09-26 RX ORDER — LATANOPROST 50 UG/ML
1 DROP INTO AFFECTED EYE IN THE EVENING SOLUTION/ DROPS OPHTHALMIC ONCE A DAY
Status: ACTIVE | COMMUNITY

## 2024-09-26 RX ORDER — WARFARIN SODIUM 5 MG/1
TABLET ORAL
Refills: 0 | Status: ACTIVE | COMMUNITY
Start: 2021-11-29

## 2024-09-26 NOTE — HPI-TODAY'S VISIT:
Patient is a very pleasant 29-year-old male who presents for follow-up.  He is a patient of Dr. Infante.  Last seen by me on 7/27/2024.  Past medical history significant for Crohn's disease, chronic fatigue syndrome, hypertension, palpitations, aortic valve insufficiency.  Past surgical history significant for ablation in 2021.  Family history noncontributory.  Last colonoscopy 10/25/2022. Patient was diagnosed with Crohn's disease in November 2021.  He had a colonoscopy December 2022 demonstrating improved, however continued mild ileitis and narrowing present.  He is currently on Humira 40 mg weekly.  He has previously been on Humira 40 mg biweekly with breakthrough symptoms.  He has also been through the Skyrizi for 3 doses but was intolerant to side effects.  Steroid use not required recently.  Extraintestinal manifestations include possible chronic fatigue and numbness.  Fecal calprotectin is a good marker of active disease.  Risk for severe disease includes male sex, small bowel disease and stricturing phenotype.  He does have history of mechanical aortic valve replacement in 2015 due to bicuspid aortic valve.  No prior IBD surgical history or personal history of cancer.  At our last office visit the patient was doing well from a GI perspective.  He was asymptomatic on Humira weekly. Patient presents for follow-up.  He reports overall he is doing relatively well from a GI perspective.  He does admit that he is having an increase in bowel movements lately.  For the past 2 months he has been under significant stress due to his father being ill and in hospice.  He attributes some of this bowel change to this.  He is curious if his current regimen is appropriate for his IBD.  He denies extraintestinal manifestations including eye changes, skin changes or arthralgias.  He denies dysphagia, dyspepsia, pyrosis, abdominal pain, unintentional weight loss, melena, hematochezia.  He relates that he just got his last dose of Humira and is in need of a refill.

## 2024-09-26 NOTE — HPI-PREVIOUS PROCEDURES
Colonoscopy, 10- - Stricture in the terminal ileum.  Biopsied. - Congested mucosa in the transverse colon, in the ascending colon and in the cecum.  Biopsied. - Diffuse mild inflammation was found in the rectosigmoid colon and in the descending colon.  Biopsied. - External hemorrhoids. - The examination was otherwise normal on direct and retroflexion views.
Never smoker

## 2024-09-26 NOTE — HPI-PREVIOUS LABS
6/17/2024 CMP within normal limits except for total bilirubin of 1.4, CBC within normal limits with no evidence of anemia7/11/2024 hepatitis B nonreactive, TB Gold negative, CBC and CMP within normal limits.  Fecal calprotectin, 06/18/2024 160 (high) 12/30/2022, hepatitis B surface antigen with reflex, QuantiFERON gold Negative

## 2024-09-26 NOTE — HPI-PREVIOUS IMAGING
9/24/2024 CT enterography consistent with active terminal ileal inflammation. No abscess or fistula. Not significantly changed since remote prior study. No new disease or colonic disease. Stable liver lesion compatible with hemangioma  CT abdomen and pelvis with contrast, enterography study, 11/18/2021 - CT findings highly suggestive of Crohn's disease, active phase of moderate severity.  Mild degree of distal ileal stricturing.  No evidence for fistulas or other complications - Multiple hepatic hemangiomas

## 2024-09-27 ENCOUNTER — LAB OUTSIDE AN ENCOUNTER (OUTPATIENT)
Dept: URBAN - METROPOLITAN AREA CLINIC 63 | Facility: CLINIC | Age: 49
End: 2024-09-27

## 2024-12-23 ENCOUNTER — TELEPHONE ENCOUNTER (OUTPATIENT)
Dept: URBAN - METROPOLITAN AREA CLINIC 63 | Facility: CLINIC | Age: 49
End: 2024-12-23

## 2025-01-10 ENCOUNTER — TELEPHONE ENCOUNTER (OUTPATIENT)
Dept: URBAN - METROPOLITAN AREA CLINIC 63 | Facility: CLINIC | Age: 50
End: 2025-01-10

## 2025-03-27 ENCOUNTER — OFFICE VISIT (OUTPATIENT)
Dept: URBAN - METROPOLITAN AREA CLINIC 63 | Facility: CLINIC | Age: 50
End: 2025-03-27

## 2025-03-27 ENCOUNTER — LAB OUTSIDE AN ENCOUNTER (OUTPATIENT)
Dept: URBAN - METROPOLITAN AREA CLINIC 63 | Facility: CLINIC | Age: 50
End: 2025-03-27

## 2025-03-27 RX ORDER — ADALIMUMAB 40MG/0.8ML
0.8 ML KIT SUBCUTANEOUS
Status: ACTIVE | COMMUNITY

## 2025-03-27 RX ORDER — ASPIRIN 81 MG/1
TABLET, FILM COATED ORAL ONCE A DAY
Refills: 0 | Status: ACTIVE | COMMUNITY
Start: 2021-11-29

## 2025-03-27 RX ORDER — LATANOPROST 50 UG/ML
1 DROP INTO AFFECTED EYE IN THE EVENING SOLUTION/ DROPS OPHTHALMIC ONCE A DAY
Status: ACTIVE | COMMUNITY

## 2025-03-27 RX ORDER — WARFARIN SODIUM 5 MG/1
TABLET ORAL
Refills: 0 | Status: ACTIVE | COMMUNITY
Start: 2021-11-29

## 2025-03-27 NOTE — HPI-ZZZTODAY'S VISIT
Patient is a very pleasant 49-year-old male who presents for 6-month follow-up.  He is a patient of Dr. Infante.  I last saw him on 9/26/2024.  Past medical history significant for Crohn's disease, chronic fatigue syndrome, hypertension, palpitations, aortic valve insufficiency.  Past surgical history reviewed.  Family history noncontributory.  Last colonoscopy 10/25/2022. Patient was diagnosed with Crohn's disease in November 2021.  Colonoscopy December 2022 demonstrating improvement however continued mild ileitis and narrowing present.  Currently on Humira 40 mg weekly.  At 40 mg biweekly had breakthrough symptoms.  He previously was inducted on Skyrizi but did not tolerate side effects.  He has not been on steroids recently.  Extraintestinal manifestations include possible chronic fatigue syndrome and numbness.  Fecal calprotectin is a good marker for active disease.  Risk for severe disease for this patient includes male sex, small bowel disease and stricturing phenotype.  He does have a history of mechanical aortic valve replacement in 2015 due to bicuspid aortic valve.  No IBD surgical history or personal history of cancer. Previously patient was doing well from a GI perspective with a small increase in number of bowel movements a day but also admitted he is under significant stress due to his father being ill and being under hospice.  He is due for repeat colonoscopy in October 2025.

## 2025-03-27 NOTE — PHYSICAL EXAM CONSTITUTIONAL:
well developed, well nourished , in no acute distress , ambulating without difficulty , normal communication ability
12-Apr-2024 12:06

## 2025-03-27 NOTE — HPI-PREVIOUS PROCEDURES
Colonoscopy, 10- - Stricture in the terminal ileum.  Biopsied. - Congested mucosa in the transverse colon, in the ascending colon and in the cecum.  Biopsied. - Diffuse mild inflammation was found in the rectosigmoid colon and in the descending colon.  Biopsied. - External hemorrhoids. - The examination was otherwise normal on direct and retroflexion views.

## 2025-04-10 ENCOUNTER — WEB ENCOUNTER (OUTPATIENT)
Dept: URBAN - METROPOLITAN AREA CLINIC 63 | Facility: CLINIC | Age: 50
End: 2025-04-10

## 2025-04-16 ENCOUNTER — WEB ENCOUNTER (OUTPATIENT)
Dept: URBAN - METROPOLITAN AREA CLINIC 63 | Facility: CLINIC | Age: 50
End: 2025-04-16

## 2025-08-12 ENCOUNTER — TELEPHONE ENCOUNTER (OUTPATIENT)
Dept: URBAN - METROPOLITAN AREA CLINIC 63 | Facility: CLINIC | Age: 50
End: 2025-08-12